# Patient Record
Sex: MALE | Race: BLACK OR AFRICAN AMERICAN | NOT HISPANIC OR LATINO | URBAN - METROPOLITAN AREA
[De-identification: names, ages, dates, MRNs, and addresses within clinical notes are randomized per-mention and may not be internally consistent; named-entity substitution may affect disease eponyms.]

---

## 2018-11-08 ENCOUNTER — EMERGENCY (EMERGENCY)
Facility: HOSPITAL | Age: 48
LOS: 1 days | Discharge: HOME | End: 2018-11-08
Attending: EMERGENCY MEDICINE | Admitting: EMERGENCY MEDICINE
Payer: SELF-PAY

## 2018-11-08 VITALS
RESPIRATION RATE: 20 BRPM | HEART RATE: 67 BPM | DIASTOLIC BLOOD PRESSURE: 78 MMHG | WEIGHT: 270.95 LBS | SYSTOLIC BLOOD PRESSURE: 129 MMHG | TEMPERATURE: 97 F

## 2018-11-08 VITALS
TEMPERATURE: 97 F | OXYGEN SATURATION: 100 % | SYSTOLIC BLOOD PRESSURE: 114 MMHG | RESPIRATION RATE: 16 BRPM | DIASTOLIC BLOOD PRESSURE: 70 MMHG | HEART RATE: 80 BPM

## 2018-11-08 DIAGNOSIS — R29.702 NIHSS SCORE 2: ICD-10-CM

## 2018-11-08 DIAGNOSIS — R11.2 NAUSEA WITH VOMITING, UNSPECIFIED: ICD-10-CM

## 2018-11-08 DIAGNOSIS — R42 DIZZINESS AND GIDDINESS: ICD-10-CM

## 2018-11-08 DIAGNOSIS — R68.83 CHILLS (WITHOUT FEVER): ICD-10-CM

## 2018-11-08 DIAGNOSIS — R53.1 WEAKNESS: ICD-10-CM

## 2018-11-08 DIAGNOSIS — H53.8 OTHER VISUAL DISTURBANCES: ICD-10-CM

## 2018-11-08 DIAGNOSIS — R26.2 DIFFICULTY IN WALKING, NOT ELSEWHERE CLASSIFIED: ICD-10-CM

## 2018-11-08 DIAGNOSIS — R61 GENERALIZED HYPERHIDROSIS: ICD-10-CM

## 2018-11-08 LAB
ALBUMIN SERPL ELPH-MCNC: 4.7 G/DL — SIGNIFICANT CHANGE UP (ref 3.3–5)
ALP SERPL-CCNC: 95 U/L — SIGNIFICANT CHANGE UP (ref 40–120)
ALT FLD-CCNC: 20 U/L — SIGNIFICANT CHANGE UP (ref 10–45)
AMPHET UR-MCNC: NEGATIVE — SIGNIFICANT CHANGE UP
ANION GAP SERPL CALC-SCNC: 18 MMOL/L — HIGH (ref 5–17)
APPEARANCE UR: CLEAR — SIGNIFICANT CHANGE UP
APTT BLD: 31.8 SEC — SIGNIFICANT CHANGE UP (ref 27.5–36.3)
AST SERPL-CCNC: 22 U/L — SIGNIFICANT CHANGE UP (ref 10–40)
BARBITURATES UR SCN-MCNC: NEGATIVE — SIGNIFICANT CHANGE UP
BASE EXCESS BLDA CALC-SCNC: -3.8 MMOL/L — LOW (ref -2–3)
BASOPHILS NFR BLD AUTO: 0.3 % — SIGNIFICANT CHANGE UP (ref 0–2)
BENZODIAZ UR-MCNC: NEGATIVE — SIGNIFICANT CHANGE UP
BILIRUB SERPL-MCNC: 0.4 MG/DL — SIGNIFICANT CHANGE UP (ref 0.2–1.2)
BILIRUB UR-MCNC: NEGATIVE — SIGNIFICANT CHANGE UP
BUN SERPL-MCNC: 13 MG/DL — SIGNIFICANT CHANGE UP (ref 7–23)
CALCIUM SERPL-MCNC: 9.6 MG/DL — SIGNIFICANT CHANGE UP (ref 8.4–10.5)
CHLORIDE SERPL-SCNC: 95 MMOL/L — LOW (ref 96–108)
CK MB CFR SERPL CALC: 3 NG/ML — SIGNIFICANT CHANGE UP (ref 0–6.7)
CK SERPL-CCNC: 430 U/L — HIGH (ref 30–200)
CO2 SERPL-SCNC: 24 MMOL/L — SIGNIFICANT CHANGE UP (ref 22–31)
COCAINE METAB.OTHER UR-MCNC: NEGATIVE — SIGNIFICANT CHANGE UP
COLOR SPEC: YELLOW — SIGNIFICANT CHANGE UP
CREAT SERPL-MCNC: 1.21 MG/DL — SIGNIFICANT CHANGE UP (ref 0.5–1.3)
DIFF PNL FLD: NEGATIVE — SIGNIFICANT CHANGE UP
EOSINOPHIL NFR BLD AUTO: 1.4 % — SIGNIFICANT CHANGE UP (ref 0–6)
GAS PNL BLDA: SIGNIFICANT CHANGE UP
GLUCOSE SERPL-MCNC: 152 MG/DL — HIGH (ref 70–99)
GLUCOSE UR QL: NEGATIVE — SIGNIFICANT CHANGE UP
HCO3 BLDA-SCNC: 14 MMOL/L — LOW (ref 21–28)
HCT VFR BLD CALC: 41.9 % — SIGNIFICANT CHANGE UP (ref 39–50)
HGB BLD-MCNC: 14.7 G/DL — SIGNIFICANT CHANGE UP (ref 13–17)
INR BLD: 1.07 — SIGNIFICANT CHANGE UP (ref 0.88–1.16)
KETONES UR-MCNC: NEGATIVE — SIGNIFICANT CHANGE UP
LACTATE SERPL-SCNC: 2.6 MMOL/L — HIGH (ref 0.5–2)
LEUKOCYTE ESTERASE UR-ACNC: NEGATIVE — SIGNIFICANT CHANGE UP
LYMPHOCYTES # BLD AUTO: 32 % — SIGNIFICANT CHANGE UP (ref 13–44)
MAGNESIUM SERPL-MCNC: 2 MG/DL — SIGNIFICANT CHANGE UP (ref 1.6–2.6)
MCHC RBC-ENTMCNC: 31.3 PG — SIGNIFICANT CHANGE UP (ref 27–34)
MCHC RBC-ENTMCNC: 35.1 G/DL — SIGNIFICANT CHANGE UP (ref 32–36)
MCV RBC AUTO: 89.1 FL — SIGNIFICANT CHANGE UP (ref 80–100)
METHADONE UR-MCNC: NEGATIVE — SIGNIFICANT CHANGE UP
MONOCYTES NFR BLD AUTO: 6.8 % — SIGNIFICANT CHANGE UP (ref 2–14)
NEUTROPHILS NFR BLD AUTO: 59.5 % — SIGNIFICANT CHANGE UP (ref 43–77)
NITRITE UR-MCNC: NEGATIVE — SIGNIFICANT CHANGE UP
OPIATES UR-MCNC: NEGATIVE — SIGNIFICANT CHANGE UP
PCO2 BLDA: 14 MMHG — LOW (ref 35–48)
PCP SPEC-MCNC: SIGNIFICANT CHANGE UP
PCP UR-MCNC: NEGATIVE — SIGNIFICANT CHANGE UP
PH BLDA: 7.6 — HIGH (ref 7.35–7.45)
PH UR: 6.5 — SIGNIFICANT CHANGE UP (ref 5–8)
PLATELET # BLD AUTO: 246 K/UL — SIGNIFICANT CHANGE UP (ref 150–400)
PO2 BLDA: 157 MMHG — HIGH (ref 83–108)
POTASSIUM SERPL-MCNC: 3.2 MMOL/L — LOW (ref 3.5–5.3)
POTASSIUM SERPL-SCNC: 3.2 MMOL/L — LOW (ref 3.5–5.3)
PROT SERPL-MCNC: 7.7 G/DL — SIGNIFICANT CHANGE UP (ref 6–8.3)
PROT UR-MCNC: NEGATIVE MG/DL — SIGNIFICANT CHANGE UP
PROTHROM AB SERPL-ACNC: 12.1 SEC — SIGNIFICANT CHANGE UP (ref 10–12.9)
RBC # BLD: 4.7 M/UL — SIGNIFICANT CHANGE UP (ref 4.2–5.8)
RBC # FLD: 11.8 % — SIGNIFICANT CHANGE UP (ref 10.3–16.9)
SAO2 % BLDA: 99 % — SIGNIFICANT CHANGE UP (ref 95–100)
SODIUM SERPL-SCNC: 137 MMOL/L — SIGNIFICANT CHANGE UP (ref 135–145)
SP GR SPEC: 1.01 — SIGNIFICANT CHANGE UP (ref 1–1.03)
THC UR QL: NEGATIVE — SIGNIFICANT CHANGE UP
TROPONIN T SERPL-MCNC: <0.01 NG/ML — SIGNIFICANT CHANGE UP (ref 0–0.01)
UROBILINOGEN FLD QL: 0.2 E.U./DL — SIGNIFICANT CHANGE UP
WBC # BLD: 6.4 K/UL — SIGNIFICANT CHANGE UP (ref 3.8–10.5)
WBC # FLD AUTO: 6.4 K/UL — SIGNIFICANT CHANGE UP (ref 3.8–10.5)

## 2018-11-08 PROCEDURE — 99285 EMERGENCY DEPT VISIT HI MDM: CPT | Mod: 25

## 2018-11-08 PROCEDURE — 70498 CT ANGIOGRAPHY NECK: CPT | Mod: 26

## 2018-11-08 PROCEDURE — 70496 CT ANGIOGRAPHY HEAD: CPT | Mod: 26

## 2018-11-08 PROCEDURE — 0042T: CPT

## 2018-11-08 PROCEDURE — 70450 CT HEAD/BRAIN W/O DYE: CPT

## 2018-11-08 PROCEDURE — 93005 ELECTROCARDIOGRAM TRACING: CPT

## 2018-11-08 PROCEDURE — 70496 CT ANGIOGRAPHY HEAD: CPT

## 2018-11-08 PROCEDURE — 70450 CT HEAD/BRAIN W/O DYE: CPT | Mod: 26,59

## 2018-11-08 PROCEDURE — 71045 X-RAY EXAM CHEST 1 VIEW: CPT

## 2018-11-08 PROCEDURE — 70498 CT ANGIOGRAPHY NECK: CPT

## 2018-11-08 PROCEDURE — 96376 TX/PRO/DX INJ SAME DRUG ADON: CPT

## 2018-11-08 PROCEDURE — 71045 X-RAY EXAM CHEST 1 VIEW: CPT | Mod: 26

## 2018-11-08 PROCEDURE — 96365 THER/PROPH/DIAG IV INF INIT: CPT | Mod: XU

## 2018-11-08 PROCEDURE — 96361 HYDRATE IV INFUSION ADD-ON: CPT

## 2018-11-08 PROCEDURE — 93010 ELECTROCARDIOGRAM REPORT: CPT

## 2018-11-08 PROCEDURE — 96375 TX/PRO/DX INJ NEW DRUG ADDON: CPT | Mod: XU

## 2018-11-08 RX ORDER — CEFTRIAXONE 500 MG/1
1 INJECTION, POWDER, FOR SOLUTION INTRAMUSCULAR; INTRAVENOUS ONCE
Qty: 0 | Refills: 0 | Status: COMPLETED | OUTPATIENT
Start: 2018-11-08 | End: 2018-11-08

## 2018-11-08 RX ORDER — ONDANSETRON 8 MG/1
4 TABLET, FILM COATED ORAL ONCE
Qty: 0 | Refills: 0 | Status: COMPLETED | OUTPATIENT
Start: 2018-11-08 | End: 2018-11-08

## 2018-11-08 RX ORDER — POTASSIUM CHLORIDE 20 MEQ
10 PACKET (EA) ORAL
Qty: 0 | Refills: 0 | Status: COMPLETED | OUTPATIENT
Start: 2018-11-08 | End: 2018-11-08

## 2018-11-08 RX ORDER — MECLIZINE HCL 12.5 MG
1 TABLET ORAL
Qty: 30 | Refills: 0 | OUTPATIENT
Start: 2018-11-08

## 2018-11-08 RX ORDER — SODIUM CHLORIDE 9 MG/ML
1000 INJECTION INTRAMUSCULAR; INTRAVENOUS; SUBCUTANEOUS ONCE
Qty: 0 | Refills: 0 | Status: COMPLETED | OUTPATIENT
Start: 2018-11-08 | End: 2018-11-08

## 2018-11-08 RX ORDER — DIAZEPAM 5 MG
5 TABLET ORAL ONCE
Qty: 0 | Refills: 0 | Status: DISCONTINUED | OUTPATIENT
Start: 2018-11-08 | End: 2018-11-08

## 2018-11-08 RX ADMIN — SODIUM CHLORIDE 1000 MILLILITER(S): 9 INJECTION INTRAMUSCULAR; INTRAVENOUS; SUBCUTANEOUS at 14:49

## 2018-11-08 RX ADMIN — SODIUM CHLORIDE 1000 MILLILITER(S): 9 INJECTION INTRAMUSCULAR; INTRAVENOUS; SUBCUTANEOUS at 18:25

## 2018-11-08 RX ADMIN — Medication 1 MILLIGRAM(S): at 15:50

## 2018-11-08 RX ADMIN — CEFTRIAXONE 1 GRAM(S): 500 INJECTION, POWDER, FOR SOLUTION INTRAMUSCULAR; INTRAVENOUS at 17:31

## 2018-11-08 RX ADMIN — Medication 100 MILLIEQUIVALENT(S): at 17:40

## 2018-11-08 RX ADMIN — SODIUM CHLORIDE 1000 MILLILITER(S): 9 INJECTION INTRAMUSCULAR; INTRAVENOUS; SUBCUTANEOUS at 16:44

## 2018-11-08 RX ADMIN — ONDANSETRON 4 MILLIGRAM(S): 8 TABLET, FILM COATED ORAL at 15:46

## 2018-11-08 RX ADMIN — CEFTRIAXONE 100 GRAM(S): 500 INJECTION, POWDER, FOR SOLUTION INTRAMUSCULAR; INTRAVENOUS at 16:59

## 2018-11-08 RX ADMIN — ONDANSETRON 4 MILLIGRAM(S): 8 TABLET, FILM COATED ORAL at 15:00

## 2018-11-08 RX ADMIN — SODIUM CHLORIDE 3000 MILLILITER(S): 9 INJECTION INTRAMUSCULAR; INTRAVENOUS; SUBCUTANEOUS at 16:47

## 2018-11-08 RX ADMIN — Medication 10 MILLIEQUIVALENT(S): at 18:47

## 2018-11-08 NOTE — ED PROVIDER NOTE - ATTENDING CONTRIBUTION TO CARE
47 M no pmh presenting with acute onset vertigo today around noon.  Describes room spinning + NV.  No chest pain back pain.  Pt with similar prior episode in past.  Visibly sweaty, vomiting upon arrival.  Stroke code called.  Neg CTs.  Pt tx and improved.  ? peripheral vertigo.  DC home with neuro fu, meclizine and precautions.  SOLANGE noted.

## 2018-11-08 NOTE — CONSULT NOTE ADULT - SUBJECTIVE AND OBJECTIVE BOX
**STROKE CODE CONSULT NOTE**    Last known well time/Time of onset of symptoms: 12:00 noon today    HPI: Patient is a 47 year old M with no significant past medical history who presented to dizziness, N/V, chills, SOB and weakness after eating a sandwich this morning. In ED VSS, patient afebrile. Patient received 1L NS bolus. Stroke code called due to patient's sudden onset symptoms. Of note, patient with recent visit to [facility] 9/20    PAST MEDICAL & SURGICAL HISTORY:      FAMILY HISTORY:      SOCIAL HISTORY:    ROS:  Constitutional: No fever, weight loss or fatigue  Eyes: No eye pain, visual disturbances  ENMT:  No difficulty hearing, tinnitus, vertigo; No sinus or throat pain  Neck: No pain or stiffness  Respiratory: No cough, wheezing  Cardiovascular: No chest pain, palpitations, shortness of breath  Gastrointestinal: No abdominal pain. No nausea, vomiting. No diarrhea/constipation  Genitourinary: No dysuria, frequency, hematuria or incontinence  Neurological: As per HPI  Skin: No itching, burning  Musculoskeletal: No joint pain or swelling  Psychiatric: No depression, anxiety    MEDICATIONS  (STANDING):  ondansetron Injectable 4 milliGRAM(s) IV Push once  sodium chloride 0.9% Bolus 1000 milliLiter(s) IV Bolus once    MEDICATIONS  (PRN):      Allergies    No Known Allergies    Intolerances        Vital Signs Last 24 Hrs  T(C): 36.3 (08 Nov 2018 14:16), Max: 36.3 (08 Nov 2018 14:16)  T(F): 97.3 (08 Nov 2018 14:16), Max: 97.3 (08 Nov 2018 14:16)  HR: 67 (08 Nov 2018 14:16) (67 - 67)  BP: 129/78 (08 Nov 2018 14:16) (129/78 - 129/78)  BP(mean): --  RR: 20 (08 Nov 2018 14:16) (20 - 20)  SpO2: --    PHYSICAL EXAM:  Constitutional: WDWN, in NAD  Cardiovascular: regular rate, no appreciable murmurs  Pulmonary: clear to auscultation bilaterally  Neurologic:  Mental status: Awake, alert and oriented x3.  Recent and remote memory intact.  Naming, repetition and comprehension intact.  Attention/concentration intact.  No dysarthria, no aphasia.  Fund of knowledge appropriate.    Cranial nerves: Pupils equally round and reactive to light, visual fields full, no nystagmus, extraocular muscles intact, V1 through V3 intact bilaterally and symmetric, face symmetric, hearing intact to finger rub, palate elevation symmetric, tongue was midline, sternocleidomastoid/shoulder shrug strength bilaterally 5/5.    Motor:  Normal bulk and tone, strength 5/5 in bilateral upper and lower extremities.   strength 5/5.  Rapid alternating movements intact and symmetric.   Sensation: Intact to light touch, proprioception, and pinprick.  No neglect.   Coordination: No dysmetria on finger-to-nose and heel-to-shin.  No clumsiness.  Reflexes: 2+ in upper and lower extremities, downgoing toes bilaterally  Gait: Narrow and steady. No ataxia.  Romberg negative    NIHSS:    Fingerstick Blood Glucose: CAPILLARY BLOOD GLUCOSE      POCT Blood Glucose.: 132 mg/dL (08 Nov 2018 14:48)       LABS:                        14.7   6.4   )-----------( 246      ( 08 Nov 2018 14:53 )             41.9           PT/INR - ( 08 Nov 2018 14:53 )   PT: 12.1 sec;   INR: 1.07          PTT - ( 08 Nov 2018 14:53 )  PTT:31.8 sec          RADIOLOGY & ADDITIONAL STUDIES:    IV-tPA (Y/N):                                   Bolus time:  Reason IV-tPA not given: **STROKE CODE CONSULT NOTE**    Last known well time/Time of onset of symptoms: 12:00 noon today    HPI: Patient is a 47 year old M with no significant past medical history who presented to dizziness, N/V, chills, SOB and weakness after eating a sandwich this morning. In ED VSS, patient afebrile. Patient received 1L NS bolus. Stroke code called due to patient's sudden onset symptoms. Of note, patient with recent visit to [facility] 9/20 where he had similar symptoms, was treated in ED and found to have normal CT and MRI brain at that time. Patient with normal CT/CTA and perfusion imaging today. Reports that he ate a turkey sandwich from home today and began to feel increasing symptoms as above, noted to have mild LLE weakness and mild b/l UE ataxia on stroke exam. Further history: patient with 8 lbs weight loss over past month, reports that this is intentional. He denies smoking cigarettes, EtOH use today, or illicit drug use. No family history of stroke or MI. Denies chest pain, shortness of breath. Admits to emesis. States that his dizziness worsens with head movements. No photophobia or neck stiffness.     PAST MEDICAL & SURGICAL HISTORY: No PMH      FAMILY HISTORY: no family history of stroke or myocardial infarction      SOCIAL HISTORY: denies smoking cigarettes, denies EtOH or illicit drug use    ROS:  Constitutional: No fever; 8 lbs weight loss over past month (intentional), no fatigue  Eyes: No eye pain, visual disturbances  ENMT:  No difficulty hearing, admits to vertigo; No sinus or throat pain  Neck: No pain or stiffness  Respiratory: No cough, wheezing  Cardiovascular: No chest pain, palpitations, shortness of breath  Gastrointestinal: No abdominal pain. Admits to nausea, vomiting. No diarrhea/constipation though states that he may have emesis soon.   Genitourinary: No dysuria, frequency, hematuria or incontinence  Neurological: As per HPI  Skin: No itching, burning  Musculoskeletal: No joint pain or swelling  Psychiatric: No depression, anxiety    MEDICATIONS  (STANDING):  ondansetron Injectable 4 milliGRAM(s) IV Push once  sodium chloride 0.9% Bolus 1000 milliLiter(s) IV Bolus once    MEDICATIONS  (PRN):      Allergies    No Known Allergies    Intolerances        Vital Signs Last 24 Hrs  T(C): 36.3 (08 Nov 2018 14:16), Max: 36.3 (08 Nov 2018 14:16)  T(F): 97.3 (08 Nov 2018 14:16), Max: 97.3 (08 Nov 2018 14:16)  HR: 67 (08 Nov 2018 14:16) (67 - 67)  BP: 129/78 (08 Nov 2018 14:16) (129/78 - 129/78)  BP(mean): --  RR: 20 (08 Nov 2018 14:16) (20 - 20)  SpO2: --    PHYSICAL EXAM:  Constitutional: Obese M, appears ill. Active emesis on exam  Cardiovascular: regular rate  Pulmonary: no increased WOB  Neurologic:  Mental status: Awake, alert and oriented x3.  Recent and remote memory intact.  Attention/concentration intact.  No dysarthria, no aphasia.  Fund of knowledge appropriate.    Cranial nerves: Pupils equally round and reactive to light, visual fields full, no nystagmus, extraocular muscles intact, V1 through V3 intact bilaterally and symmetric, face symmetric, hearing intact to finger rub, palate elevation symmetric, tongue was midline, sternocleidomastoid/shoulder shrug strength bilaterally 5/5.    Motor:  Normal bulk and tone, strength 5/5 in bilateral UE. LLE 4+/5, RLE 5/5.  5/5.  Rapid alternating movements intact and symmetric.   Sensation: Intact to light touch, proprioception, and pinprick.  No neglect.   Coordination: Mild b/l UE dysmetria on finger-to-nose, no dysmetria on heel-to-shin.    Gait: Not observed as patient with active emesis    NIHSS: 2    Fingerstick Blood Glucose: CAPILLARY BLOOD GLUCOSE      POCT Blood Glucose.: 132 mg/dL (08 Nov 2018 14:48)       LABS:                        14.7   6.4   )-----------( 246      ( 08 Nov 2018 14:53 )             41.9           PT/INR - ( 08 Nov 2018 14:53 )   PT: 12.1 sec;   INR: 1.07          PTT - ( 08 Nov 2018 14:53 )  PTT:31.8 sec          RADIOLOGY & ADDITIONAL STUDIES:    IV-tPA (Y/N):  N               Reason IV-tPA not given: Mild isolated deficits

## 2018-11-08 NOTE — ED PROVIDER NOTE - MEDICAL DECISION MAKING DETAILS
46yo M p/w severe dizziness, N/V, weakness, diaphoresis; code stroke called out of concern for posterior circulation pathology but stroke w/u negative; labs unrevealing of etiology for sx; IVF, antiemetics, anxiolytics given and pt improved w/ obs, fluids.

## 2018-11-08 NOTE — ED PROVIDER NOTE - OBJECTIVE STATEMENT
48yo M w/ no sig PMH presenting to ED w/ acute onset dizziness, N/V, diaphoresis/chills, and weakness that started around 12pm today. Patient states he ate a turkey sandwich for lunch and shortly afterward became suddenly dizzy, vomited several times, and felt very unsteady and weak. Started sweating profusely during this time. States similar episode about 1mo ago (later confirmed 9/20/18) where he presented to [facility] and pt says "the doctors were concerned I was having a stroke." Describes having had a head CT scan but he "wouldn't allow" the contrast studies and was discharged with a diagnosis of dehydration. He later says he felt slightly dizzy earlier in the morning and took one tab of motrin and felt better and went on with daily activities. Endorses visual changes (?double vision), but denies HA, fevers, cough, sore throat, CP, palpitations, cough, SOB, abd pain, urinary sx, bowel sx, or sick contacts at home.

## 2018-11-08 NOTE — CONSULT NOTE ADULT - ASSESSMENT
47M with no significant PMH presents with increasing dizziness, nausea, emesis, diaphoresis since noon. Patient s/p stroke code with CT, CTA, perfusion imaging negative. Undergoing workup in ED for metabolic causes and/or vertigo    Plan  - No TPA at this time; patient approaching end of window and imaging negative, deficits isolated and mild  - Continue to w/u metabolic causes  - Valium 2mg for vertigo  - Monitor neurologic status

## 2018-11-08 NOTE — ED ADULT TRIAGE NOTE - CHIEF COMPLAINT QUOTE
Patient complaining of dizziness, nausea, vomiting, chills, SOB and weakness since 12PM today.  Patient states, "I have been having these spells, I was at [facility] last month for it and they said I was dehydrated."  Patient denies any CP, fevers or any other complaints.  No facial droop, slurred speech or neuro deficits noted.  . Spoke with Dr. Lopez who stated that this is not too abnormal with her history.  Told patient if this happens again and does not resolve in a short amount of time to call us again.

## 2018-11-08 NOTE — ED ADULT NURSE REASSESSMENT NOTE - NS ED NURSE REASSESS COMMENT FT1
only one 10 MEQ infusion of KCl needed per Dr. Hickman.  Pt. alert, awake, oriented x 4.   Skin warm/dry.  Respirations non labored.   Nausea improved.  No additional vomiting.  Dizziness improving.

## 2018-11-08 NOTE — ED ADULT NURSE NOTE - OBJECTIVE STATEMENT
pt reports nausea, vomiting, dizziness, onset 1200 today. States symptoms started after eating a sandwich for lunch.  States he vomited multiple times.   Pt reports chills, did not measure a temperature at home.   Pt moving all extremities.  Face symmetrical.  Oriented x 4.  Pt states he us unable to walk due to weakness.  Denies tinnitus.  Pt actively vomiting.  Clear, light brown emesis noted.  pt states he had similar occurrence last month and went to ED at [facility] for treatment.  pt unsure of diagnosis.

## 2018-11-08 NOTE — ED ADULT NURSE NOTE - NSIMPLEMENTINTERV_GEN_ALL_ED
Implemented All Fall Risk Interventions:  [place] to call system. Call bell, personal items and telephone within reach. Instruct patient to call for assistance. Room bathroom lighting operational. Non-slip footwear when patient is off stretcher. Physically safe environment: no spills, clutter or unnecessary equipment. Stretcher in lowest position, wheels locked, appropriate side rails in place. Provide visual cue, wrist band, yellow gown, etc. Monitor gait and stability. Monitor for mental status changes and reorient to person, place, and time. Review medications for side effects contributing to fall risk. Reinforce activity limits and safety measures with patient and family.

## 2018-11-08 NOTE — ED PROVIDER NOTE - PROGRESS NOTE DETAILS
pt appearing in distress from dizziness, appearing somnolent and w/ several episodes of vomiting witnessed earlier in exam/interview; pt states unable to get up to assess gait 2/2 dizziness, focused neuro exam performed and discussed w/ attending will call stroke code to r/o posterior circulation CVA particularly given hx and stated sudden onset of sx marked improvement in lethargy, resolved hypothermia; labs remain unrevealing; stroke w/u negative. Pt feeling improved, still with slight dizziness but awake, alert, asking appropriate questions. Still receiving IVF and IV KCl runs. Discussed findings w/ patient and disposition planning - trial of ambulation planned after IVF and will have referral coordinator help arrange neurology f/u in anticipation of discharge. walked pt, not unsteady on feet - still endorses mild dizziness that comes and goes and he states about 90-95% better overall; discussed d/c plan with PMD f/u and will be contacted w/ neurology referral tomorrow. pt agree w/ dispo plan and will be discharged home w/ f/u.

## 2018-11-08 NOTE — ED ADULT NURSE REASSESSMENT NOTE - NS ED NURSE REASSESS COMMENT FT1
pt able to walk independently with steady gait.   Referrals coordinator arranged follow up with neuro.

## 2018-11-08 NOTE — ED ADULT NURSE NOTE - CHIEF COMPLAINT QUOTE
Patient complaining of dizziness, nausea, vomiting, chills, SOB and weakness since 12PM today.  Patient states, "I have been having these spells, I was at [facility] last month for it and they said I was dehydrated."  Patient denies any CP, fevers or any other complaints.  No facial droop, slurred speech or neuro deficits noted.  .

## 2018-11-10 LAB
CULTURE RESULTS: SIGNIFICANT CHANGE UP
SPECIMEN SOURCE: SIGNIFICANT CHANGE UP

## 2018-11-13 LAB
CULTURE RESULTS: SIGNIFICANT CHANGE UP
CULTURE RESULTS: SIGNIFICANT CHANGE UP
SPECIMEN SOURCE: SIGNIFICANT CHANGE UP
SPECIMEN SOURCE: SIGNIFICANT CHANGE UP
